# Patient Record
Sex: FEMALE | Race: WHITE | NOT HISPANIC OR LATINO | Employment: FULL TIME | URBAN - METROPOLITAN AREA
[De-identification: names, ages, dates, MRNs, and addresses within clinical notes are randomized per-mention and may not be internally consistent; named-entity substitution may affect disease eponyms.]

---

## 2019-09-06 ENCOUNTER — TELEPHONE (OUTPATIENT)
Dept: NEUROLOGY | Facility: CLINIC | Age: 54
End: 2019-09-06

## 2019-09-06 NOTE — TELEPHONE ENCOUNTER
----- Message from Cristóbal Nuñez sent at 9/6/2019 10:55 AM CDT -----  Contact: Patient @ [phone]  Patient calling to get an update on the NP appt, pt is being referred by toni Campbell pls call

## 2020-02-12 ENCOUNTER — TELEPHONE (OUTPATIENT)
Dept: NEUROLOGY | Facility: CLINIC | Age: 55
End: 2020-02-12

## 2020-03-11 ENCOUNTER — OFFICE VISIT (OUTPATIENT)
Dept: URGENT CARE | Facility: CLINIC | Age: 55
End: 2020-03-11
Payer: COMMERCIAL

## 2020-03-11 VITALS
BODY MASS INDEX: 24.84 KG/M2 | WEIGHT: 135 LBS | DIASTOLIC BLOOD PRESSURE: 92 MMHG | TEMPERATURE: 99 F | HEART RATE: 76 BPM | HEIGHT: 62 IN | OXYGEN SATURATION: 100 % | SYSTOLIC BLOOD PRESSURE: 152 MMHG

## 2020-03-11 DIAGNOSIS — S83.92XA SPRAIN OF LEFT KNEE, UNSPECIFIED LIGAMENT, INITIAL ENCOUNTER: ICD-10-CM

## 2020-03-11 DIAGNOSIS — S80.02XA CONTUSION OF LEFT KNEE, INITIAL ENCOUNTER: Primary | ICD-10-CM

## 2020-03-11 DIAGNOSIS — Z02.83 ENCOUNTER FOR DRUG SCREENING: ICD-10-CM

## 2020-03-11 LAB
CTP QC/QA: YES
POC 10 PANEL DRUG SCREEN: NEGATIVE

## 2020-03-11 PROCEDURE — 99204 OFFICE O/P NEW MOD 45 MIN: CPT | Mod: 25,S$GLB,, | Performed by: FAMILY MEDICINE

## 2020-03-11 PROCEDURE — 80305 DRUG TEST PRSMV DIR OPT OBS: CPT | Mod: QW,S$GLB,, | Performed by: FAMILY MEDICINE

## 2020-03-11 PROCEDURE — 73562 X-RAY EXAM OF KNEE 3: CPT | Mod: LT,S$GLB,, | Performed by: RADIOLOGY

## 2020-03-11 PROCEDURE — 80305 POCT RAPID DRUG SCREEN 10 PANEL: ICD-10-PCS | Mod: QW,S$GLB,, | Performed by: FAMILY MEDICINE

## 2020-03-11 PROCEDURE — 73562 XR KNEE 3 VIEW LEFT: ICD-10-PCS | Mod: LT,S$GLB,, | Performed by: RADIOLOGY

## 2020-03-11 PROCEDURE — 99204 PR OFFICE/OUTPT VISIT, NEW, LEVL IV, 45-59 MIN: ICD-10-PCS | Mod: 25,S$GLB,, | Performed by: FAMILY MEDICINE

## 2020-03-11 NOTE — LETTER
[facility] [address], [place] [address]  Phone: [phone]  Fax: [phone]  [facility]: [phone]    Pt Name: Rosey Fernandes  Injury Date: 03/11/2020   Employee ID:  Date of First Treatment: 03/11/2020   Company: [employer]      Appointment Time: 10:45 AM Arrived:11:00  AM   Provider: Fredi Walsh MD Time Out:12:20  PM     Office Treatment:   EXAM  HOME TODAY REGULAR DUTY TOMORROW    1. Contusion of left knee, initial encounter    2. Sprain of left knee, unspecified ligament, initial encounter          Patient Instructions: Attention not to aggravate affected area      Restrictions: Home today, Regular Duty     Return Appointment: 3/18/2020 at 09:00  AM

## 2020-03-11 NOTE — PROGRESS NOTES
Subjective:       Patient ID: Rosey Fernandes is a 54 y.o. female.    Chief Complaint: Knee Injury (LEFT)            RT  KNEE,  03/11/2020   FELL ON KNEE WATER ON FLOOR      54-year-old [facility] [employer] with no relevant past medical history presents to Occupational Medicine with left sided knee pain post contusion. Patient slipped on the floor and landed on her right knee.     Knee Injury   This is a recurrent problem. The current episode started today. The problem occurs constantly. The problem has been unchanged. The symptoms are aggravated by walking. She has tried nothing for the symptoms. The treatment provided no relief.       Constitution: Negative.   HENT: Negative.    Neck: negative.   Cardiovascular: Negative.    Eyes: Negative.    Respiratory: Negative.    Gastrointestinal: Negative.    Genitourinary: Negative.    Musculoskeletal: Positive for pain.   Skin: Negative.    Allergic/Immunologic: Negative.    Neurological: Negative.         Objective:      Physical Exam   Constitutional: She is oriented to person, place, and time. She appears well-developed and well-nourished.   HENT:   Head: Normocephalic and atraumatic.   Eyes: Pupils are equal, round, and reactive to light. Conjunctivae and EOM are normal.   Neck: Normal range of motion. Neck supple.   Musculoskeletal: Normal range of motion.        Left knee: She exhibits laceration and bony tenderness. She exhibits normal range of motion, no swelling, no effusion, no ecchymosis, no deformity, no erythema, normal alignment, no LCL laxity, normal patellar mobility, normal meniscus and no MCL laxity.        Legs:  Superficial skin burn with no bleeding. Patella tenderness to palpation. No abnormal patella movements. Pain on full leg flexion with weight bearing.    Varus & Valgus Maneuver's negative.  Drawer test negative       Neurological: She is alert and oriented to person, place, and time.   Skin: Skin is warm and dry.   Psychiatric: She has a  normal mood and affect. Her behavior is normal. Judgment and thought content normal.       Xr Knee 3 View Left    Result Date: 3/11/2020  EXAMINATION: XR KNEE 3 VIEW LEFT CLINICAL HISTORY: Contusion of left knee, initial encounter TECHNIQUE: 3 views of the left knee COMPARISON: None FINDINGS: No acute displaced fracture, dislocation or suspicious osseous lesion. Anatomic alignment is maintained. Regional soft tissues are grossly unremarkable.     1. No acute displaced fracture, dislocation or suspicious osseous lesion. Electronically signed by: Francisco J Morataya Date:    03/11/2020 Time:    12:01      Assessment:       1. Contusion of left knee, initial encounter    2. Sprain of left knee, unspecified ligament, initial encounter        Plan:       I expect to discharge patient at time next visit.     Patient Instructions: Attention not to aggravate affected area   Restrictions: Home today, Regular Duty  Follow up in about 1 week (around 3/18/2020).

## 2020-04-21 DIAGNOSIS — Z01.84 ANTIBODY RESPONSE EXAMINATION: ICD-10-CM

## 2020-04-22 DIAGNOSIS — R43.0 ANOSMIA: Primary | ICD-10-CM

## 2020-04-23 ENCOUNTER — LAB VISIT (OUTPATIENT)
Dept: FAMILY MEDICINE | Facility: CLINIC | Age: 55
End: 2020-04-23
Payer: OTHER GOVERNMENT

## 2020-04-23 DIAGNOSIS — R43.0 ANOSMIA: ICD-10-CM

## 2020-04-23 PROCEDURE — U0002 COVID-19 LAB TEST NON-CDC: HCPCS

## 2020-04-24 ENCOUNTER — TELEPHONE (OUTPATIENT)
Dept: INFECTIOUS DISEASES | Facility: CLINIC | Age: 55
End: 2020-04-24

## 2020-04-24 DIAGNOSIS — U07.1 COVID-19 VIRUS DETECTED: ICD-10-CM

## 2020-04-24 LAB — SARS-COV-2 RNA RESP QL NAA+PROBE: DETECTED

## 2020-04-24 NOTE — TELEPHONE ENCOUNTER
Called pt on behalf of [facility] [employer] to discuss COVID-19 result. All questions were answered and return to work policies were reviewed. Pt instructed to f/u with [facility] once criteria are met.    Lost sense of smell; no fever, no fatigue. No other symptoms.  Symptom onset: 4/19/2020    Your test was POSITIVE for COVID-19 (coronavirus).       Prevention steps for patients with confirmed COVID-19       Stay home and stay away from family members and friends. The CDC says, you can leave home after these three things have happened: 1) You have had no fever for at least 72 hours (that is three full days of no fever without the use of medicine that reduces fevers) 2) AND other symptoms have improved (for example, when your cough or shortness of breath have improved) 3) AND at least 7 days have passed since your symptoms first appeared.   Separate yourself from other people and animals in your home.   Call ahead before visiting your doctor.   Wear a facemask.   Cover your coughs and sneezes.   Wash your hands often with soap and water; hand  can be used, too.   Avoid sharing personal household items.   Wipe down surfaces used daily.   Monitor your symptoms. Seek prompt medical attention if your illness is worsening (e.g., difficulty breathing).    Before seeking care, call your healthcare provider.   If you have a medical emergency and need to call 911, notify the dispatch personnel that you have, or are being evaluated for COVID-19. If possible, put on a facemask before emergency medical services arrive.        Recommended precautions for household members, intimate partners, and caregivers in a home setting of a patient with symptomatic laboratory-confirmed COVID-19 or a patient under investigation.  Household members, intimate partners, and caregivers in the home setting awaiting tests results have close contact with a person with symptomatic, laboratory-confirmed COVID-19 or a person under  investigation. Close contacts should monitor their health; they should call their provider right away if they develop symptoms suggestive of COVID-19 (e.g., fever, cough, shortness of breath).    Close contacts should also follow these recommendations:   Make sure that you understand and can help the patient follow their provider's instructions for medication(s) and care. You should help the patient with basic needs in the home and provide support for getting groceries, prescriptions, and other personal needs.   Monitor the patient's symptoms. If the patient is getting sicker, call his or her healthcare provider and tell them that the patient has laboratory-confirmed COVID-19. If the patient has a medical emergency and you need to call 911, notify the dispatch personnel that the patient has, or is being evaluated for COVID-19.   Household members should stay in another room or be  from the patient. Household members should use a separate bedroom and bathroom, if available.   Prohibit visitors.   Household members should care for any pets in the home.   Make sure that shared spaces in the home have good air flow, such as by an air conditioner or an opened window, weather permitting.   Perform hand hygiene frequently. Wash your hands often with soap and water for at least 20 seconds or use an alcohol-based hand  (that contains > 60% alcohol) covering all surfaces of your hands and rubbing them together until they feel dry. Soap and water should be used preferentially.   Avoid touching your eyes, nose, and mouth.   The patient should wear a facemask. If the patient is not able to wear a facemask (for example, because it causes trouble breathing), caregivers should wear a mask when they are in the same room as the patient.   Wear a disposable facemask and gloves when you touch or have contact with the patient's blood, stool, or body fluids, such as saliva, sputum, nasal mucus, vomit,  urine.  o Throw out disposable facemasks and gloves after using them. Do not reuse.  o When removing personal protective equipment, first remove and dispose of gloves. Then, immediately clean your hands with soap and water or alcohol-based hand . Next, remove and dispose of facemask, and immediately clean your hands again with soap and water or alcohol-based hand .   You should not share dishes, drinking glasses, cups, eating utensils, towels, bedding, or other items with the patient. After the patient uses these items, you should wash them thoroughly (see below Wash laundry thoroughly).   Clean all high-touch surfaces, such as counters, tabletops, doorknobs, bathroom fixtures, toilets, phones, keyboards, tablets, and bedside tables, every day. Also, clean any surfaces that may have blood, stool, or body fluids on them.   Use a household cleaning spray or wipe, according to the label instructions. Labels contain instructions for safe and effective use of the cleaning product including precautions you should take when applying the product, such as wearing gloves and making sure you have good ventilation during use of the product.   Wash laundry thoroughly.  o Immediately remove and wash clothes or bedding that have blood, stool, or body fluids on them.  o Wear disposable gloves while handling soiled items and keep soiled items away from your body. Clean your hands (with soap and water or an alcohol-based hand ) immediately after removing your gloves.  o Read and follow directions on labels of laundry or clothing items and detergent. In general, using a normal laundry detergent according to washing machine instructions and dry thoroughly using the warmest temperatures recommended on the clothing label.   Place all used disposable gloves, facemasks, and other contaminated items in a lined container before disposing of them with other household waste. Clean your hands (with soap and  water or an alcohol-based hand ) immediately after handling these items. Soap and water should be used preferentially if hands are visibly dirty.   Discuss any additional questions with your state or local health department or healthcare provider. Check available hours when contacting your local health department.    For more information see CDC link below.      https://www.cdc.gov/coronavirus/2019-ncov/hcp/guidance-prevent-spread.html#precautions        Sources:  [facility], [place] Department of [facility] and [facility]     Sincerely,     Janet Jensen NP

## 2020-04-28 ENCOUNTER — LAB VISIT (OUTPATIENT)
Dept: LAB | Facility: HOSPITAL | Age: 55
End: 2020-04-28
Attending: INTERNAL MEDICINE
Payer: OTHER GOVERNMENT

## 2020-04-28 DIAGNOSIS — Z01.84 ANTIBODY RESPONSE EXAMINATION: ICD-10-CM

## 2020-04-28 LAB — SARS-COV-2 IGG SERPL QL IA: POSITIVE

## 2020-04-28 PROCEDURE — 36415 COLL VENOUS BLD VENIPUNCTURE: CPT

## 2020-04-28 PROCEDURE — 86769 SARS-COV-2 COVID-19 ANTIBODY: CPT

## 2020-05-07 ENCOUNTER — OFFICE VISIT (OUTPATIENT)
Dept: NEUROLOGY | Facility: CLINIC | Age: 55
End: 2020-05-07
Payer: OTHER GOVERNMENT

## 2020-05-07 VITALS — BODY MASS INDEX: 24.84 KG/M2 | WEIGHT: 135 LBS | HEIGHT: 62 IN

## 2020-05-07 DIAGNOSIS — M54.2 NECK PAIN: Primary | ICD-10-CM

## 2020-05-07 PROCEDURE — 99999 PR PBB SHADOW E&M-EST. PATIENT-LVL II: ICD-10-PCS | Mod: PBBFAC,,, | Performed by: PSYCHIATRY & NEUROLOGY

## 2020-05-07 PROCEDURE — 99499 NO LOS: ICD-10-PCS | Mod: S$PBB,,, | Performed by: PSYCHIATRY & NEUROLOGY

## 2020-05-07 PROCEDURE — 99212 OFFICE O/P EST SF 10 MIN: CPT | Mod: PBBFAC | Performed by: PSYCHIATRY & NEUROLOGY

## 2020-05-07 PROCEDURE — 99999 PR PBB SHADOW E&M-EST. PATIENT-LVL II: CPT | Mod: PBBFAC,,, | Performed by: PSYCHIATRY & NEUROLOGY

## 2020-05-07 PROCEDURE — 99499 UNLISTED E&M SERVICE: CPT | Mod: S$PBB,,, | Performed by: PSYCHIATRY & NEUROLOGY

## 2020-05-07 NOTE — LETTER
May 7, 2020      Fredi Walsh MD  [address]  [address]  [facility] [address]           [facility]  [address]  [place] [address]  Phone: [phone]  Fax: [phone]          Patient: Rosey Fernandes   MR Number: [id number]   YOB: 1965   Date of Visit: 5/7/2020       Dear Dr. Fredi Walsh:    Thank you for referring Rosey Fernandes to me for evaluation. Attached you will find relevant portions of my assessment and plan of care.    If you have questions, please do not hesitate to call me. I look forward to following Rosey Fernandes along with you.    Sincerely,    Ced Cespedes MD    Enclosure  CC:  No Recipients    If you would like to receive this communication electronically, please contact [email or web address] or [phone] to request more information on [employer] access.    For providers and/or their staff who would like to refer a patient to [facility], please contact us through our one-stop-shop provider referral line, [facility], at [phone].    If you feel you have received this communication in error or would no longer like to receive these types of communications, please e-mail [email or web address]

## 2020-05-07 NOTE — PROGRESS NOTES
"Mrs. Fernandes came to my clinic for evaluation of left neck pain that has been present since the summer of 2019. She has had an MRI cervical spine done in August 2019 that was ordered by her PCP and performed at [facility]. The results are uncertain and she was never contacted with results by her PCP, only told "see a neurologist". She has a recent CV1 Ab positive result and a positive CV19 PCR test from 14 days ago. She agreed to defer the face to face visit until her PCR test is returned negative. She left the disc with MRI images with me for review. She will reschedule for face to face vs virtual appointment.     She denies UE weakness or atrophy. She has intermittent L neck pain. She denies any gait instability or bowel/bladder incontinence, or any other signs of myelopathy.    Rafael Cespedes MD  [facility]       "

## 2020-11-02 ENCOUNTER — TELEPHONE (OUTPATIENT)
Dept: NEUROLOGY | Facility: CLINIC | Age: 55
End: 2020-11-02

## 2020-11-02 NOTE — TELEPHONE ENCOUNTER
"Returned pt's phone call and she sounded very agitated. Requesting her MRI disc back that she left with dr dominguez, who she states 'promised her that he would give it back to her'. States, "Who can I talk to to make a complaint with, about dr dominguez losing my disc, not even reading it, dropping me by the waist-side by not even following up with me and the numerous phone calls that I have made to you guys with never any return calls back?" Stated, "I want everybody's name and I better get a phone call back."  "

## 2020-11-02 NOTE — TELEPHONE ENCOUNTER
----- Message from Carolina Kevin sent at 11/2/2020  3:11 PM CST -----  Pt is stating that she has call many of times to get her MRI disc and would like for the nurse or the medical team to give her a call back at [phone] pt is stating that no one has return her calls. Pt would like to get the disc back to go to another

## 2020-11-03 NOTE — TELEPHONE ENCOUNTER
Call returned to pt. Advised that MRI CD was found and in my possession. Pt states that she is merely upset that Dr Cespedes promised pt that would follow up with her virtually after he looked at CD, and this did not happen. Apologized to pt and offered assistance to find another provider for neck pain and cervical abnormality. States her PCP advised she see a neurosurgeon anyway. Appt scheduled 11/16 with Mikel at [facility]. CD postal mailed back to pt. Appt reminder postal mailed to pt.

## 2020-11-17 ENCOUNTER — OFFICE VISIT (OUTPATIENT)
Dept: NEUROSURGERY | Facility: CLINIC | Age: 55
End: 2020-11-17
Payer: OTHER GOVERNMENT

## 2020-11-17 VITALS
DIASTOLIC BLOOD PRESSURE: 85 MMHG | TEMPERATURE: 97 F | HEART RATE: 61 BPM | SYSTOLIC BLOOD PRESSURE: 141 MMHG | WEIGHT: 147.5 LBS | BODY MASS INDEX: 26.98 KG/M2

## 2020-11-17 DIAGNOSIS — M54.2 NECK PAIN: Primary | ICD-10-CM

## 2020-11-17 PROCEDURE — 99204 PR OFFICE/OUTPT VISIT, NEW, LEVL IV, 45-59 MIN: ICD-10-PCS | Mod: S$PBB,,, | Performed by: PHYSICIAN ASSISTANT

## 2020-11-17 PROCEDURE — 99213 OFFICE O/P EST LOW 20 MIN: CPT | Mod: PBBFAC | Performed by: PHYSICIAN ASSISTANT

## 2020-11-17 PROCEDURE — 99999 PR PBB SHADOW E&M-EST. PATIENT-LVL III: ICD-10-PCS | Mod: PBBFAC,,, | Performed by: PHYSICIAN ASSISTANT

## 2020-11-17 PROCEDURE — 99204 OFFICE O/P NEW MOD 45 MIN: CPT | Mod: S$PBB,,, | Performed by: PHYSICIAN ASSISTANT

## 2020-11-17 PROCEDURE — 99999 PR PBB SHADOW E&M-EST. PATIENT-LVL III: CPT | Mod: PBBFAC,,, | Performed by: PHYSICIAN ASSISTANT

## 2020-11-17 RX ORDER — CLOBETASOL PROPIONATE 0.5 MG/G
AEROSOL, FOAM TOPICAL 2 TIMES DAILY
COMMUNITY

## 2020-11-17 RX ORDER — CETIRIZINE HYDROCHLORIDE 10 MG/1
10 TABLET ORAL DAILY
COMMUNITY

## 2020-11-17 RX ORDER — OMEPRAZOLE 10 MG/1
10 CAPSULE, DELAYED RELEASE ORAL DAILY
COMMUNITY

## 2020-11-17 RX ORDER — IBUPROFEN 600 MG/1
600 TABLET ORAL EVERY 6 HOURS PRN
COMMUNITY

## 2020-11-17 NOTE — PROGRESS NOTES
[facility]  Neurosurgery    SUBJECTIVE:     History of Present Illness:  Rosey Fernandes is a very pleasant 55 y.o. female who presents with chronic neck and left shoulder pain.    Symptom onset:  3 years ago  Location:  Neck radiating into her left shoulder  Pain level:  8/10 at worst, no pain today  Inciting factors:  Increased activity  Relieving factors:  Ibuprofen, rest, lying down  Numbness:  Denies  Weakness:  Denies  Denies b/b dysfunction and saddle anesthesia  Denies dropping items from hands/changes in handwriting/difficulty buttoning shirts  Denies gait disturbance   Denies falls    Treatments tried:  -PT:  Has not tried  -ELLY:  Has not tried  -Gabapentin:  Has not tried  -Muscle relaxer:  Has not tried  -Rx pain medications:  Ibuprofen 600  -Spine surgery:  Never    Blood thinners:  None per chart review    (Not in a hospital admission)      Review of patient's allergies indicates:  No Known Allergies    Past Medical History:   Diagnosis Date    Allergy      Past Surgical History:   Procedure Laterality Date    COLON SURGERY      COSMETIC SURGERY      HYSTERECTOMY       Family History   Problem Relation Age of Onset    No Known Problems Brother      Social History     Tobacco Use    Smoking status: Never Smoker    Smokeless tobacco: Never Used   Substance Use Topics    Alcohol use: Yes     Frequency: 2-3 times a week    Drug use: Not on file        Review of Systems:  As noted in HPI    OBJECTIVE:     Vital Signs (Most Recent):  Temp: 96.7 °F (35.9 °C) (11/17/20 1337)  Pulse: 61 (11/17/20 1337)  BP: (!) 141/85 (11/17/20 1337)    Physical Exam:  General: well developed, well nourished, no distress  Head: normocephalic, atraumatic  Neurologic: Alert and oriented. Thought content appropriate  GCS: Motor: 6/Verbal: 5/Eyes: 4 GCS Total: 15  Language: No aphasia  Speech: No dysarthria  Cranial nerves: face symmetric, tongue midline, CN II-XII grossly intact.   Eyes: pupils equal, round,  reactive to light with accommodation, EOMI.   Pulmonary: normal respirations, not labored, no accessory muscles used  Sensory: intact to light touch throughout  Motor Strength: Moves all extremities spontaneously with good tone.  Full strength upper and lower extremities. No abnormal movements seen.     Strength  Deltoids Triceps Biceps Wrist Extension Wrist Flexion Hand    Upper: R 5/5 5/5 5/5 5/5 5/5 5/5    L 5/5 5/5 5/5 5/5 5/5 5/5     Iliopsoas Quadriceps Knee  Flexion Tibialis  anterior Gastro- cnemius EHL   Lower: R 5/5 5/5 5/5 5/5 5/5 5/5    L 5/5 5/5 5/5 5/5 5/5 5/5     DTR's - 2 + and symmetric brachioradialis, patellar, & achilles  Garay: absent  Clonus: absent  Skin: warm, dry and intact, no rashes  Gait: normal  Tandem Gait: No difficulty     Cervical ROM: full  Lumbar ROM: full   SI Joint tenderness: Negative   Pain on Hip ROM: Negative  Midline Bony Tenderness:  Negative throughout  Paraspinous muscle tenderness:  Positive for her left shoulder  Spurling's:  Negative bilaterally    Diagnostic Results:  I have personally reviewed imaging and agree with the findings.     Cervical MRI  No significant central stenosis  Outside imaging Disc to be scanned into chart    ASSESSMENT/PLAN:     Rosey Fernandes is a 55 y.o. female who presents with chronic neck and left shoulder pain.  She is neurologically intact on exam in has not tried conservative treatment options mostly due to recent COVID shutdown delaying her care.  I will refer patient to physical therapy and have her follow-up in 3 months      Please feel free to call with any further questions    Disclaimer: This note was dictated by speech recognition. Minor errors in transcription may be present.  Please call with any questions.      Maile Morin PA-C  [facility]  Department of Neurosurgery  [phone]

## 2020-12-09 ENCOUNTER — CLINICAL SUPPORT (OUTPATIENT)
Dept: REHABILITATION | Facility: HOSPITAL | Age: 55
End: 2020-12-09
Payer: OTHER GOVERNMENT

## 2020-12-09 DIAGNOSIS — G89.29 CHRONIC NECK PAIN: ICD-10-CM

## 2020-12-09 DIAGNOSIS — R29.898 SHOULDER WEAKNESS: ICD-10-CM

## 2020-12-09 DIAGNOSIS — M54.2 CHRONIC NECK PAIN: ICD-10-CM

## 2020-12-09 DIAGNOSIS — M54.2 NECK PAIN: ICD-10-CM

## 2020-12-09 DIAGNOSIS — M25.69 BACK STIFFNESS: ICD-10-CM

## 2020-12-09 DIAGNOSIS — R29.3 POOR POSTURE: ICD-10-CM

## 2020-12-09 PROCEDURE — 97110 THERAPEUTIC EXERCISES: CPT | Mod: PN

## 2020-12-09 PROCEDURE — 97162 PT EVAL MOD COMPLEX 30 MIN: CPT | Mod: PN

## 2020-12-10 NOTE — PLAN OF CARE
[facility]  Physical Therapy Initial Evaluation    Date: 12/9/2020   Name: Rosey Fernandes  Clinic Number: [id number]    Therapy Diagnosis:   Encounter Diagnoses   Name Primary?    Neck pain     Chronic neck pain     Poor posture     Back stiffness     Shoulder weakness      Physician: Maile Morin, *    Physician Orders: PT Eval and Treat   Medical Diagnosis from Referral:M54.2 (ICD-10-CM) - Neck pain     Evaluation Date: 12/9/2020  Authorization Period Expiration: 4/2/21  Plan of Care Expiration: 2/3/21  Visit # / Visits authorized: 1/ 16    Time In: 11:00 am  Time Out: 11:45 am  Total Appointment Time (timed & untimed codes): 45 minutes    Precautions: Standard    Subjective   Date of onset: chronic  History of current condition - Rosey reports: chronic neck pain since 2006 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 She began noticing pain while having a stethoscope around her neck. She now has intermittent cervical pain that radiates into L upper trap. Her pain is worse with prolonged computer work. She also reports having Psoriasis and the L side of her neck is very sensitive to light touch, maybe due to the steroid creme she uses intermittently. She did have a fatty deposit in her upper back and had liposuction to remove it but still have a small spot at the base of her cervical spine. Denies any numbness/tingling into L UE. Reports she sleeps prone with her head turned to one side.     Medical History:   Past Medical History:   Diagnosis Date    Allergy        Surgical History:   Rosey Fernandes  has a past surgical history that includes Hysterectomy; Colon surgery; and Cosmetic surgery.    Medications:   Rosey has a current medication list which includes the following prescription(s): cetirizine, clobetasol, ibuprofen, and omeprazole.    Allergies:   Review of patient's allergies indicates:  No Known Allergies     Imaging, none:     Prior Therapy: none  Occupation:  Respiratory therapist  Prior Level of Function: independent  Current Level of Function: independent    Pain:  Current 1/10, worst 6/10, best 0/10   Location: left cervical spine and upper trap   Description: Aching  Aggravating Factors: sleeping, bending, computer work  Easing Factors: nothing    Patients goals: reduce pain    Objective     Observation: FHP, B rounded shoulders, small Dowager's hump      Cervical Range of Motion:    Degrees Pain   Flexion 65    Extension 60    Right Rotation 100%    Left Rotation 90%    Right Side Bending 45 L sided pain    Left Side Bending 55       Shoulder Range of Motion: WNL bilaterally      Upper Extremity Strength  (R) UE  (L) UE    Shoulder flexion: 5/5 Shoulder flexion: 5/5   Shoulder Abduction: 5/5 Shoulder abduction: 5/5   Shoulder ER 5/5 Shoulder ER 5/5   Shoulder IR 5/5 Shoulder IR 5/5   Lower Trap 4-/5 Lower Trap 4-/5   Middle Trap 4/5 Middle Trap 4/5   Rhomboids 4/5 Rhomboids 4/5         Special Tests:  Quadrant test Positive for facet loading on L   distraction No change in symptoms         Joint Mobility: hypomobile CPA's globally throughout T spine and at C 4-5.       Palpation:  Very TTP with CPA's at mid thoracic spine and C spine     Sensation: SILT  B UE     Flexibility: WNL suboccipitals and upper traps        Limitation/Restriction for FOTO Neck Survey    Therapist reviewed FOTO scores for Rosey Fernandes on 12/9/2020.   FOTO documents entered into [employer] - see Media section.    Limitation Score: 41%         TREATMENT   Treatment Time In: 11:35 am  Treatment Time Out: 11:45 am  Total Treatment time (time-based codes) separate from Evaluation: 10 minutes    Rosey received therapeutic exercises to develop strength, ROM and posture for 10 minutes including:  Thoracic extensions over foam roll 15 x  sidelying open book 6 x ea  Thoracic cat/camel 10 x  Quadruped thoracic rotation/ext with shoulder in IR 4 x ea  B ER with looped t band 5 x 5 sec    Home Exercises and  Patient Education Provided    Education provided:   - role of PT, plan of care, involved anatomy and pathology,  goals, rational for treatment and exercise, scheduling limitations  - discussed avoidance of prone sleeping due to poor cervical positioning    Written Home Exercises Provided: yes.  Exercises were reviewed and Rosey was able to demonstrate them prior to the end of the session.  Rosey demonstrated good  understanding of the education provided.     See EMR under Patient Instructions for exercises provided 12/9/2020.    Assessment   Rosey is a 55 y.o. female referred to outpatient Physical Therapy with a medical diagnosis of Neck pain. Patient presents with mild decrease in bilateral scapular strength, hypomobile CPA's thoracic spine and lower cervical spine and hypersensitivity to touch mid thoracic spine. Demonstrates nearly full ROM cervical spine with mild L C spine pain with R lateral flexion and L sided cervical pain with quadrant testing. Postural abnormality noted of FHP, B rounded shoulders, and Dowager's hump. Pt would benefit from skilled PT services in order to address listed deficits, improve tolerance to activities, establish HEP, and decrease pain to maximize quality of life. Pt is motivated to participate in therapy and is in agreement with POC.           Patient prognosis is Good.   Patientt will benefit from skilled outpatient Physical Therapy to address the deficits stated above and in the chart below, provide patient /family education, and to maximize patientt's level of independence.     Plan of care discussed with patient: Yes  Patient's spiritual, cultural and educational needs considered and patient is agreeable to the plan of care and goals as stated below:     Anticipated Barriers for therapy: chronicity of symptoms    Medical Necessity is demonstrated by the following  History  Co-morbidities and personal factors that may impact the plan of care Co-morbidities:   history of  hysterectomy and colon sx    Personal Factors:   no deficits     moderate   Examination  Body Structures and Functions, activity limitations and participation restrictions that may impact the plan of care Body Regions:   neck  upper extremities    Body Systems:    gross symmetry  ROM  strength  transitions  motor control    Participation Restrictions:   Prolonged computer work    Activity limitations:   Learning and applying knowledge  no deficits    General Tasks and Commands  no deficits    Communication  no deficits    Mobility  no deficits    Self care  no deficits    Domestic Life  no deficits    Interactions/Relationships  no deficits    Life Areas  no deficits    Community and Social Life  no deficits         high   Clinical Presentation evolving clinical presentation with changing clinical characteristics moderate   Decision Making/ Complexity Score: moderate     Short Term Goals (4 Weeks):      1. Pt to increase strength of mid trap/rhomboids muscles to >/= 4+/5 to allow for improvement in bending/reaching during work as a respiratory therapist.   2.. Pt to demonstrate understanding of pathology and use of HEP for improved self-management of symptoms.   3. Pt to report decreased pain to </=  5/10 at worst for increased participation in social/community activities.   4. The patient will report tolerating prolonged computer work with 50% limitation to indicate improved tolerance to work activities.      Long Term Goals (8 Weeks):      1. Pt to increase strength of low trap to >/= 4/5 to allow for improvement in  bending/reaching during work as a respiratory therapist.  2. Pt to demonstrate proficiency with HEP for improved independence with self-management of condition/symptoms and prevention of re-injury.   3. Pt to report decreased pain to </= 4/10 at worst for increased participation in social/community activities.   4. Pt to increased FOTO performance to </= 31% limitation for improved participation.   5.  The patient will report tolerating prolonged computer workwith 25% limitation to indicate improved tolerance to work activities.       Plan   Plan of care Certification: 12/9/2020 to 3/2/21.    Outpatient Physical Therapy 1 times weekly for 4 weeks to include the following interventions: Manual Therapy, Moist Heat/ Ice, Neuromuscular Re-ed, Patient Education, Self Care, Therapeutic Activites and Therapeutic Exercise.     JONG MOORE, PT

## 2020-12-15 ENCOUNTER — IMMUNIZATION (OUTPATIENT)
Dept: OBSTETRICS AND GYNECOLOGY | Facility: CLINIC | Age: 55
End: 2020-12-15
Payer: OTHER GOVERNMENT

## 2020-12-15 DIAGNOSIS — Z23 NEED FOR VACCINATION: ICD-10-CM

## 2020-12-15 PROCEDURE — 91300 COVID-19, MRNA, LNP-S, PF, 30 MCG/0.3 ML DOSE VACCINE: CPT | Mod: ,,, | Performed by: FAMILY MEDICINE

## 2020-12-15 PROCEDURE — 0001A COVID-19, MRNA, LNP-S, PF, 30 MCG/0.3 ML DOSE VACCINE: CPT | Mod: CV19,,, | Performed by: FAMILY MEDICINE

## 2020-12-15 PROCEDURE — 0001A COVID-19, MRNA, LNP-S, PF, 30 MCG/0.3 ML DOSE VACCINE: ICD-10-PCS | Mod: CV19,,, | Performed by: FAMILY MEDICINE

## 2020-12-15 PROCEDURE — 91300 COVID-19, MRNA, LNP-S, PF, 30 MCG/0.3 ML DOSE VACCINE: ICD-10-PCS | Mod: ,,, | Performed by: FAMILY MEDICINE

## 2021-01-05 ENCOUNTER — IMMUNIZATION (OUTPATIENT)
Dept: OBSTETRICS AND GYNECOLOGY | Facility: CLINIC | Age: 56
End: 2021-01-05
Payer: OTHER GOVERNMENT

## 2021-01-05 DIAGNOSIS — Z23 NEED FOR VACCINATION: ICD-10-CM

## 2021-01-05 PROCEDURE — 91300 COVID-19, MRNA, LNP-S, PF, 30 MCG/0.3 ML DOSE VACCINE: CPT | Mod: PBBFAC

## 2021-01-05 PROCEDURE — 0002A COVID-19, MRNA, LNP-S, PF, 30 MCG/0.3 ML DOSE VACCINE: CPT | Mod: PBBFAC

## 2025-01-31 ENCOUNTER — HOSPITAL ENCOUNTER (OUTPATIENT)
Dept: RADIOLOGY | Facility: HOSPITAL | Age: 60
Discharge: HOME | End: 2025-01-31
Payer: OTHER GOVERNMENT

## 2025-01-31 DIAGNOSIS — Z12.31 ENCOUNTER FOR SCREENING MAMMOGRAM FOR BREAST CANCER: ICD-10-CM

## 2025-01-31 PROCEDURE — 77067 SCR MAMMO BI INCL CAD: CPT | Mod: 26,,, | Performed by: RADIOLOGY

## 2025-01-31 PROCEDURE — 77067 SCR MAMMO BI INCL CAD: CPT | Mod: TC

## 2025-01-31 PROCEDURE — 77063 BREAST TOMOSYNTHESIS BI: CPT | Mod: 26,,, | Performed by: RADIOLOGY
